# Patient Record
Sex: FEMALE | Race: WHITE | NOT HISPANIC OR LATINO | Employment: STUDENT | URBAN - METROPOLITAN AREA
[De-identification: names, ages, dates, MRNs, and addresses within clinical notes are randomized per-mention and may not be internally consistent; named-entity substitution may affect disease eponyms.]

---
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
## 2021-01-08 ENCOUNTER — TELEMEDICINE (OUTPATIENT)
Dept: BEHAVIORAL/MENTAL HEALTH CLINIC | Facility: CLINIC | Age: 21
End: 2021-01-08
Payer: COMMERCIAL

## 2021-01-08 DIAGNOSIS — F43.22 ADJUSTMENT DISORDER WITH ANXIOUS MOOD: Primary | Chronic | ICD-10-CM

## 2021-01-08 PROCEDURE — 90832 PSYTX W PT 30 MINUTES: CPT | Performed by: SOCIAL WORKER

## 2021-01-08 NOTE — PSYCH
Time : 3pm-3:30pm,  30 minutes   Session Type :  Facetime     Subjective     Client reports that things are going better between him mother and step dad  His father is at him for not calling at [employer] and New Years  Dave Baez gets stressed by father and looks to get time away from him  He feels anxious to respond to his text and wanted help with what to say ? We agreed he would draft a letter to Dad        Objective     No Suicide or Homicide Ideation, has cultural and spiritual support , affect is congruent , content is on Dad issues  , Mood is anxious     Assessment     Client presents with clinical issue of insecure attachment  , Benefited from CBT informed modal     Plan     Client will report draft letter to dad and review with writer before sending

## 2021-01-15 ENCOUNTER — TELEPHONE (OUTPATIENT)
Dept: BEHAVIORAL/MENTAL HEALTH CLINIC | Facility: CLINIC | Age: 21
End: 2021-01-15

## 2021-01-15 NOTE — TELEPHONE ENCOUNTER
Reached out by [employer] today   He said we are bi-weekly as is true and we saw each other last week  Evidently he as put on by accident and did not need the appt and felt next week was fine

## 2021-01-22 ENCOUNTER — TELEMEDICINE (OUTPATIENT)
Dept: BEHAVIORAL/MENTAL HEALTH CLINIC | Facility: CLINIC | Age: 21
End: 2021-01-22
Payer: COMMERCIAL

## 2021-01-22 ENCOUNTER — DOCUMENTATION (OUTPATIENT)
Dept: BEHAVIORAL/MENTAL HEALTH CLINIC | Facility: CLINIC | Age: 21
End: 2021-01-22

## 2021-01-22 DIAGNOSIS — F43.22 ADJUSTMENT DISORDER WITH ANXIOUS MOOD: Primary | Chronic | ICD-10-CM

## 2021-01-22 PROCEDURE — 90834 PSYTX W PT 45 MINUTES: CPT | Performed by: SOCIAL WORKER
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
## 2024-05-15 ENCOUNTER — APPOINTMENT (OUTPATIENT)
Dept: RADIOLOGY | Facility: CLINIC | Age: 24
End: 2024-05-15
Payer: COMMERCIAL

## 2024-05-15 ENCOUNTER — OFFICE VISIT (OUTPATIENT)
Dept: URGENT CARE | Facility: CLINIC | Age: 24
End: 2024-05-15
Payer: COMMERCIAL

## 2024-05-15 VITALS — HEART RATE: 77 BPM | TEMPERATURE: 97.1 F | WEIGHT: 156.2 LBS | OXYGEN SATURATION: 100 % | RESPIRATION RATE: 18 BRPM

## 2024-05-15 DIAGNOSIS — S62.001A CLOSED NONDISPLACED FRACTURE OF SCAPHOID OF RIGHT WRIST, UNSPECIFIED PORTION OF SCAPHOID, INITIAL ENCOUNTER: Primary | ICD-10-CM

## 2024-05-15 DIAGNOSIS — S52.614A CLOSED NONDISPLACED FRACTURE OF STYLOID PROCESS OF RIGHT ULNA, INITIAL ENCOUNTER: ICD-10-CM
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
## 2024-05-15 DIAGNOSIS — S90.31XA CONTUSION OF RIGHT FOOT, INITIAL ENCOUNTER: ICD-10-CM

## 2024-05-15 PROBLEM — R31.9 BLOOD IN URINE: Status: ACTIVE | Noted: 2024-05-15

## 2024-05-15 PROBLEM — B07.9 VERRUCA VULGARIS: Status: ACTIVE | Noted: 2024-05-15

## 2024-05-15 PROBLEM — M26.4 MALOCCLUSION OF TEETH: Status: ACTIVE | Noted: 2024-05-15

## 2024-05-15 PROBLEM — D48.5 NEOPLASM OF UNCERTAIN BEHAVIOR OF SKIN: Status: ACTIVE | Noted: 2024-05-15

## 2024-05-15 PROBLEM — M79.632 PAIN OF LEFT FOREARM: Status: ACTIVE | Noted: 2021-09-26

## 2024-05-15 PROBLEM — D17.20 LIPOMA OF EXTREMITY: Status: ACTIVE | Noted: 2023-05-12

## 2024-05-15 PROBLEM — R00.1 SINUS BRADYCARDIA: Status: ACTIVE | Noted: 2023-07-20

## 2024-05-15 PROBLEM — Z28.20 IMMUNIZATION NOT CARRIED OUT BECAUSE OF PATIENT DECISION: Status: ACTIVE | Noted: 2021-03-13

## 2024-05-15 PROBLEM — S52.90XA FRACTURE OF RADIUS: Status: ACTIVE | Noted: 2024-05-15

## 2024-05-15 PROBLEM — D22.9 MELANOCYTIC NEVUS: Status: ACTIVE | Noted: 2024-05-15

## 2024-05-15 PROBLEM — B35.4 TINEA CORPORIS: Status: ACTIVE | Noted: 2018-05-14

## 2024-05-15 PROBLEM — B27.90 INFECTIOUS MONONUCLEOSIS: Status: ACTIVE | Noted: 2018-05-14

## 2024-05-15 PROBLEM — R16.1 SPLENOMEGALY: Status: ACTIVE | Noted: 2018-05-14

## 2024-05-15 PROBLEM — D23.5 BENIGN NEOPLASM OF SKIN OF TRUNK: Status: ACTIVE | Noted: 2023-05-12

## 2024-05-15 PROBLEM — H52.10 MYOPIA: Status: ACTIVE | Noted: 2024-05-15

## 2024-05-15 PROBLEM — L60.0 INGROWING TOENAIL: Status: ACTIVE | Noted: 2024-05-15

## 2024-05-15 PROBLEM — Z91.038 ALLERGY TO HORNET VENOM: Status: ACTIVE | Noted: 2023-06-21

## 2024-05-15 PROBLEM — F12.90 MARIJUANA USER: Status: ACTIVE | Noted: 2023-06-21

## 2024-05-15 PROBLEM — R63.6 UNDERWEIGHT: Status: ACTIVE | Noted: 2021-03-13

## 2024-05-15 PROBLEM — E55.9 VITAMIN D DEFICIENCY: Status: ACTIVE | Noted: 2023-05-12

## 2024-05-15 PROCEDURE — 25650 CLTX ULNAR STYLOID FRACTURE: CPT

## 2024-05-15 PROCEDURE — 73630 X-RAY EXAM OF FOOT: CPT

## 2024-05-15 PROCEDURE — 73110 X-RAY EXAM OF WRIST: CPT

## 2024-05-15 PROCEDURE — 99214 OFFICE O/P EST MOD 30 MIN: CPT

## 2024-05-15 PROCEDURE — 25622 CLTX CARPL SCPHD FX W/O MNPJ: CPT

## 2024-05-15 RX ORDER — MOXIFLOXACIN 5 MG/ML
SOLUTION/ DROPS OPHTHALMIC
COMMUNITY

## 2024-05-15 RX ORDER — EPINEPHRINE 0.3 MG/.3ML
INJECTION SUBCUTANEOUS
COMMUNITY

## 2024-05-15 RX ORDER — AZITHROMYCIN 200 MG/5ML
POWDER, FOR SUSPENSION ORAL
COMMUNITY

## 2024-05-15 RX ORDER — GUANFACINE 3 MG/1
TABLET, EXTENDED RELEASE ORAL
COMMUNITY

## 2024-05-15 NOTE — PROGRESS NOTES
[facility]        NAME: Eros Toth is a 23 y.o. female  : 2000    MRN: [id number]  DATE: May 15, 2024  TIME: 8:23 PM    Assessment and Plan   Closed nondisplaced fracture of scaphoid of right wrist, unspecified portion of scaphoid, initial encounter [S62.001A]  1. Closed nondisplaced fracture of scaphoid of right wrist, unspecified portion of scaphoid, initial encounter  Ambulatory Referral to Orthopedic Surgery      2. Fall, initial encounter  XR wrist 3+ vw right    XR foot 3+ vw right    XR foot 3+ vw right    XR wrist 3+ vw right      3. Contusion of right foot, initial encounter  Ambulatory Referral to Orthopedic Surgery      4. Closed nondisplaced fracture of styloid process of right ulna, initial encounter  Ambulatory Referral to Orthopedic Surgery        Questionable old vs. New styloid process fracture of right ulna. Probable right scaphoid fracture, splint as directed (see procedure documentation). No acute fracture of toe per provider read, will f/u with final read by radiologist if findings are significant. Foot placed in orthopedic shoe and 1st-2nd toes buddy-taped. Educated on OTC products for pain relief. Referral placed to orthopedics for further management.      Patient Instructions     Wear orthopedic shoe and keep splint in place until seen by orthopedics. Rest and elevate hand/wrist often, applying ice every 3-4 hours for 20 minutes at a time for next 24 hours. Take tylenol/ibuprofen every 4-6 hours as needed for pain. Follow-up with orthopedics within 3-5 days. Report to the ER sooner if symptoms worsen.       Chief Complaint     Chief Complaint   Patient presents with    Fall     Fell off skateboard last night and is having difficulty with ROM to right wrist and right foot.           History of Present Illness       23 year old male presents for evaluation of right foot and right wrist pain s/p fall while skateboarding yesterday. He reports hyperextended his right great toe  and fell, landing on his right wrist. He reports a h/o a styloid process fracture of the same wrist as a teenager. He denies prior surgeries. He reports pain with supination and flexion of his wrist. He also noticed increased bruising on the ventral surface of his right wrist. He reports inability to bend his right great toe and decreased sensation. He has taken advil and applied ice with minimal improvement.     Fall  The accident occurred 12 to 24 hours ago. The fall occurred while recreating/playing. She fell from a height of 3 to 5 ft. The point of impact was the right foot and right wrist. The pain is present in the right wrist and right foot. The pain is at a severity of 5/10. The pain is moderate. The symptoms are aggravated by pressure on injury, use of injured limb, rotation, flexion, ambulation and movement. Pertinent negatives include no abdominal pain, bowel incontinence, fever, headaches, hearing loss, hematuria, loss of consciousness, nausea, numbness, tingling, visual change or vomiting. She has tried ice and NSAID for the symptoms. The treatment provided mild relief.       Review of Systems   Review of Systems   Constitutional:  Negative for activity change, appetite change, chills, fatigue and fever.   Respiratory:  Negative for cough, chest tightness and shortness of breath.    Cardiovascular:  Negative for chest pain.   Gastrointestinal:  Negative for abdominal pain, bowel incontinence, nausea and vomiting.   Genitourinary:  Negative for hematuria.   Musculoskeletal:  Negative for arthralgias, back pain, joint swelling, myalgias and neck pain.   Skin:  Positive for color change. Negative for pallor, rash and wound.   Neurological:  Negative for dizziness, tingling, loss of consciousness, light-headedness, numbness and headaches.         Current Medications       Current Outpatient Medications:     azithromycin (ZITHROMAX) 200 mg/5 mL suspension, , Disp: , Rfl:     dexmethylphenidate (FOCALIN XR)  10 MG 24 hr capsule, Take by mouth (Patient not taking: Reported on 5/15/2024), Disp: , Rfl:     EPINEPHrine (EPIPEN) 0.3 mg/0.3 mL SOAJ, Take 0.3 mg as needed by injection route., Disp: , Rfl:     guanFACINE HCl ER (Intuniv) 3 MG TB24, , Disp: , Rfl:     moxifloxacin (Vigamox) 0.5 % ophthalmic solution, , Disp: , Rfl:     sildenafil (REVATIO) 20 mg tablet, sildenafil (pulmonary hypertension) 20 mg tablet  Take 1 tablet as needed by oral route., Disp: , Rfl:     Current Allergies     Allergies as of 05/15/2024 - Reviewed 05/15/2024   Allergen Reaction Noted    Hornet venom Anaphylaxis 05/15/2024    Amoxicillin  09/08/2015    Procaine Dizziness 05/15/2024            The following portions of the patient's history were reviewed and updated as appropriate: allergies, current medications, past family history, past medical history, past social history, past surgical history and problem list.     Past Medical History:   Diagnosis Date    ADHD (attention deficit hyperactivity disorder)        Past Surgical History:   Procedure Laterality Date    TONSILECTOMY AND ADNOIDECTOMY Bilateral 2006       History reviewed. No pertinent family history.      Medications have been verified.        Objective   Pulse 77   Temp (!) 97.1 °F (36.2 °C)   Resp 18   Wt 70.9 kg (156 lb 3.2 oz)   SpO2 100%        Physical Exam     Physical Exam  Vitals and nursing note reviewed.   Constitutional:       General: She is awake. She is not in acute distress.     Appearance: Normal appearance. She is well-developed and normal weight.   HENT:      Head: Normocephalic and atraumatic.   Cardiovascular:      Pulses: Normal pulses.           Radial pulses are 2+ on the right side and 2+ on the left side.        Dorsalis pedis pulses are 2+ on the right side.        Posterior tibial pulses are 2+ on the right side.   Pulmonary:      Effort: Pulmonary effort is normal.   Musculoskeletal:         General: Swelling, tenderness and signs of injury present.  No deformity.      Right wrist: Swelling, tenderness and bony tenderness present. No snuff box tenderness. Decreased range of motion. Normal pulse.      Left wrist: Normal.      Cervical back: Normal range of motion and neck supple.        Feet:       Comments: Right wrist - styloid process tenderness. Bruising present on ventral surface of wrist. Pulses 2+, capillary refill <2 seconds. Full ROM of fingers without pain.    Feet:      Right foot:      Skin integrity: Skin integrity normal.      Comments: Right great toe TTP, radiating down foot. Unable to fully flex toe.   Skin:     General: Skin is warm and dry.      Findings: Bruising present. No abrasion, ecchymosis, rash or wound.      Comments: Bruising to right wrist and right great toe.   Neurological:      General: No focal deficit present.      Mental Status: She is alert and oriented to person, place, and time.   Psychiatric:         Mood and Affect: Mood normal.         Behavior: Behavior normal. Behavior is cooperative.         Thought Content: Thought content normal.         Judgment: Judgment normal.     Orthopedic injury treatment    Date/Time: 5/15/2024 6:45 PM    Performed by: SALOMÓN Mcduffie  Authorized by: SALOMÓN Mcduffie    Patient Location:  [facility]  [place] Protocol:  Consent: Verbal consent obtained.  Risks and benefits: risks, benefits and alternatives were discussed  Consent given by: patient  Patient understanding: patient does not state understanding of the procedure being performed  Patient consent: the patient's understanding of the procedure does not match consent given  Required items: required blood products, implants, devices, and special equipment available  Patient identity confirmed: verbally with patient    Injury location:  Wrist  Location details:  Right wrist  Injury type:  Fracture  Fracture type: ulnar styloid    Fracture type: ulnar styloid and scaphoid    Neurovascular status: Neurovascularly intact    Distal  perfusion: normal    Neurological function: normal    Range of motion: reduced    Local anesthesia used?: No    General anesthesia used?: No    Manipulation performed?: No    Immobilization:  Splint  Splint type:  Volar short arm  Supplies used:  Ortho-Glass, elastic bandage and cotton padding  Neurovascular status: Neurovascularly intact    Distal perfusion: normal    Neurological function: normal    Range of motion: unchanged    Patient tolerance:  Patient tolerated the procedure well with no immediate complications  Orthopedic injury treatment    Date/Time: 5/15/2024 6:45 PM    Performed by: SALOMÓN Mcduffie  Authorized by: SALOMÓN Mcduffie    Patient Location:  [facility] Protocol:  Consent: Verbal consent obtained.  Risks and benefits: risks, benefits and alternatives were discussed  Consent given by: patient  Patient understanding: patient states understanding of the procedure being performed  Patient consent: the patient's understanding of the procedure matches consent given  Required items: required blood products, implants, devices, and special equipment available  Patient identity confirmed: verbally with patient    Injury location:  Toe  Location details:  Right great toe  Injury type:  Soft tissue  Neurovascular status: Neurovascularly intact    Distal perfusion: normal    Neurological function: normal    Range of motion: reduced    Local anesthesia used?: No    General anesthesia used?: No    Immobilization:  Tape  Neurovascular status: Neurovascularly intact    Distal perfusion: normal    Neurological function: normal    Range of motion: unchanged    Patient tolerance:  Patient tolerated the procedure well with no immediate complications   1st and 2nd toe of right foot mitul

## 2024-05-15 NOTE — PATIENT INSTRUCTIONS
Wear orthopedic shoe and keep splint in place until seen by orthopedics. Rest and elevate hand/wrist often, applying ice every 3-4 hours for 20 minutes at a time for next 24 hours. Take tylenol/ibuprofen every 4-6 hours as needed for pain. Follow-up with orthopedics within 3-5 days. Report to the ER sooner if symptoms worsen.

## 2024-05-16 ENCOUNTER — TELEPHONE (OUTPATIENT)
Age: 24
End: 2024-05-16

## 2024-05-16 NOTE — TELEPHONE ENCOUNTER
Patient is being referred to a orthopedics. Please schedule accordingly.    [facility]   [phone]